# Patient Record
Sex: MALE | Race: WHITE | URBAN - METROPOLITAN AREA
[De-identification: names, ages, dates, MRNs, and addresses within clinical notes are randomized per-mention and may not be internally consistent; named-entity substitution may affect disease eponyms.]

---

## 2021-12-01 ENCOUNTER — OFFICE VISIT (OUTPATIENT)
Dept: ENT CLINIC | Age: 36
End: 2021-12-01
Payer: COMMERCIAL

## 2021-12-01 ENCOUNTER — TELEPHONE (OUTPATIENT)
Dept: ENT CLINIC | Age: 36
End: 2021-12-01

## 2021-12-01 VITALS
HEART RATE: 67 BPM | HEIGHT: 73 IN | DIASTOLIC BLOOD PRESSURE: 77 MMHG | BODY MASS INDEX: 28.65 KG/M2 | WEIGHT: 216.2 LBS | TEMPERATURE: 97.3 F | SYSTOLIC BLOOD PRESSURE: 125 MMHG

## 2021-12-01 DIAGNOSIS — H73.20 MYRINGITIS: Primary | ICD-10-CM

## 2021-12-01 DIAGNOSIS — H61.22 IMPACTED CERUMEN OF LEFT EAR: ICD-10-CM

## 2021-12-01 PROCEDURE — 1036F TOBACCO NON-USER: CPT | Performed by: OTOLARYNGOLOGY

## 2021-12-01 PROCEDURE — G8484 FLU IMMUNIZE NO ADMIN: HCPCS | Performed by: OTOLARYNGOLOGY

## 2021-12-01 PROCEDURE — G8419 CALC BMI OUT NRM PARAM NOF/U: HCPCS | Performed by: OTOLARYNGOLOGY

## 2021-12-01 PROCEDURE — 99204 OFFICE O/P NEW MOD 45 MIN: CPT | Performed by: OTOLARYNGOLOGY

## 2021-12-01 PROCEDURE — G8427 DOCREV CUR MEDS BY ELIG CLIN: HCPCS | Performed by: OTOLARYNGOLOGY

## 2021-12-01 RX ORDER — CITALOPRAM 20 MG/1
20 TABLET ORAL DAILY
COMMUNITY

## 2021-12-01 RX ORDER — CIPROFLOXACIN HYDROCHLORIDE 3.5 MG/ML
4 SOLUTION/ DROPS TOPICAL 2 TIMES DAILY
Qty: 10 ML | Refills: 0 | Status: SHIPPED | OUTPATIENT
Start: 2021-12-01 | End: 2021-12-08

## 2021-12-01 NOTE — PROGRESS NOTES
[address] [facility]  NEW PATIENT HISTORY AND PHYSICAL NOTE      Patient Name: Edgardo Persaud  Medical Record Number:  [id number]  Primary Care Physician:  Jeimy Bro MD    ChiefComplaint     Chief Complaint   Patient presents with    Ear Problem     States Lt ear drains, used to be worse. If lays on LT ear states he can feel his ear close up. States the drainage is really dark, and thick like tar. Drainage smells funny. History of Present Illness     Edgardo Persaud is an 39 y.o. male with concern for left ear drainage and pruritus intermittently for the past year, with intermittent sharp otalgia-denies alix hearing loss, vertigo; has attempted to remove wax from the left ear without relief. History of tympanic membrane rupture in the setting of acute otitis media 5-6 years ago (left ear). History of tympanostomy tubes as a teenager. No history of tympanoplasty or other middle ear surgery. Former smoker, quit . Past Medical History     No past medical history on file. Past Surgical History     No past surgical history on file. Family History     No family history on file.     Social History     Social History     Tobacco Use    Smoking status: Former Smoker     Quit date:      Years since quittin.9    Smokeless tobacco: Never Used   Vaping Use    Vaping Use: Never used   Substance Use Topics    Alcohol use: Yes     Comment: occ    Drug use: No        Allergies     No Known Allergies    Medications     Current Outpatient Medications   Medication Sig Dispense Refill    citalopram (CELEXA) 20 MG tablet Take 20 mg by mouth daily      Cyclobenzaprine HCl (FLEXERIL PO) Take by mouth      ciprofloxacin (CILOXAN) 0.3 % ophthalmic solution Place 4 drops in ear(s) 2 times daily for 7 days - to left ear 10 mL 0    dexamethasone (DECADRON) 0.1 % ophthalmic suspension Apply 4 drops to the left ear twice daily for 7 days 5 mL 1     No current equal bilaterally  ORAL CAVITY: No masses or lesions palpated, uvula is midline, moist mucous membranes, 0+ tonsils, no dentition  NECK: Normal range of motion, no thyromegaly, trachea is midline, no lymphadenopathy, no neck masses, no crepitus  CHEST: Normal respiratory effort, no retractions, breathing comfortably  SKIN: No rashes, normal appearing skin, no evidence of skin lesions/tumors  NEURO: CN 2, 3, 4, 5, 6, 7, 11, 12 intact bilaterally     Data/Imaging Review       Procedure     Binocular Otomicroscopy     Pre op: Left ear impacted cerumen   Post op: Myringitis of the left ear, ganesh-superior quadrant    Procedure : Binocular otomicroscopy  Surgeon: ALDAIR Zurita  Estimated Blood Loss: None    After obtaining consent, the patient was placed in the examination chair in the reclined position.      -Right ear: External auditory canal with slight stenosis, tympanic membrane showing myringosclerosis, middle ear appears clear  -Left ear: External auditory canal with impacted cerumen - removed, tympanic membrane showing myringitis over the left superior-anterior quadrant, middle ear appears clear - could not evaluate pars flaccida due to cerumen (patient with pain on      * Patient tolerated the procedure well with no complications        Assessment and Plan     1. Myringitis  Anterosuperior quadrant - uncertain if this is primary or related to underlying cholesteatoma given repeated drainage. Will start her on ciprofloxacin/dexamethasone drops, return to clinic in 1 week. Dry ear precautions outlined. - ciprofloxacin (CILOXAN) 0.3 % ophthalmic solution; Place 4 drops in ear(s) 2 times daily for 7 days - to left ear  Dispense: 10 mL; Refill: 0  - dexamethasone (DECADRON) 0.1 % ophthalmic suspension; Apply 4 drops to the left ear twice daily for 7 days  Dispense: 5 mL; Refill: 1    2. Impacted cerumen of left ear  Removed at this visit    Return in about 1 week (around 12/8/2021).     MD Wesley Locke

## 2021-12-01 NOTE — TELEPHONE ENCOUNTER
We received a prior authorization request for the Maxidex that was prescribed for the patient. The following medications do not require a PA. Would you like for me to proceed with the PA?  Please advise  Dexamethasone Sodium Phosphate Tier   PrednisoLONE Acetate Tier 1  Neomycin-Polymyxin-Dexameth Tier 1  Fluorometholone Tier 1    Tobramycin-Dexamethasone Tier 1

## 2021-12-01 NOTE — TELEPHONE ENCOUNTER
Per Dr. Rene Hawthorne, he would to switch the patient to the Dexamethasone Sodium Phosphate 0.1%. The prescription was given verbally to [facility] pharmacy [phone].

## 2021-12-03 NOTE — PROGRESS NOTES
Ykaov Rachel   1985, 39 y.o. male   [id number]       Referring Provider: Lucita Olivier MD  Referral Type: In an order in [address] [place] [address]    Reason for Visit: Evaluation of the cause of disorders of hearing    ADULT AUDIOLOGIC EVALUATION      Yakov Rachel is a 39 y.o. male seen today, 12/8/2021 , for an initial audiologic evaluation. Patient was seen by Lucita Olivier MD following today's evaluation. AUDIOLOGIC AND OTHER PERTINENT MEDICAL HISTORY:      Yakov Rachel noted history of ear pressure/fullness of the left ear. Patient reports history of ear infections and pressure equalizing (PE) tubes as a child. He also notes history of occupational noise exposure for 5+ years. No additional significant otologic or medical history was reported. Yakov Rachel denied tinnitus, dizziness, imbalance, history of falls, history of head trauma and family history of hearing loss. Date: 12/8/2021     IMPRESSIONS:      Today's results revealed hearing within normal limits. with excellent word recognition, bilaterally. Follow medical recommendations of Lucita Olivier MD.     ASSESSMENT AND FINDINGS:     Otoscopy revealed: Clear ear canals bilaterally    RIGHT EAR:  Hearing Sensitivity:Hearing sensitivity within normal limits from 250-8000 Hz  Speech Recognition Threshold: 5 dB HL  Word Recognition: Excellent (96%), based on NU-6 25-word list at 50 dBHL using recorded speech stimuli. Tympanometry: Normal peak pressure and compliance, Type A tympanogram, consistent with normal middle ear function. LEFT EAR:  Hearing Sensitivity:Hearing sensitivity within normal limits from 250-8000 Hz  Speech Recognition Threshold: 10 dB HL  Word Recognition: Excellent (100%), based on NU-6 25-word list at 50 dBHL using recorded speech stimuli. Tympanometry: Normal peak pressure and compliance, Type A tympanogram, consistent with normal middle ear function. Reliability: Good   Transducer:  Inserts    See scanned audiogram dated 12/8/2021  for results. PATIENT EDUCATION:       The following items were discussed with the patient:   - Good Communication Strategies  - Noise-Induced Hearing Loss and use of Hearing Protection Devices (HPDs)     Educational information was shared in the After Visit Summary. RECOMMENDATIONS:                                                                                                                                                                                                                                                            The following items are recommended based on patient report and results from today's appointment:   - Continue medical follow-up with Mio Goldberg MD.   - Retest hearing as medically indicated and/or sooner if a change in hearing is noted. - Utilize \"Good Communication Strategies\" as discussed to assist in speech understanding with communication partners. - Use hearing protection devices (HPDs), such as protective ear muffs and ear plugs, when exposed to dangerous sound levels.        Shante Rolle [facility]  Audiologist    Chart CC'd to: Mio Goldberg MD      Degree of   Hearing Sensitivity dB Range   Within Normal Limits (WNL) 0 - 20   Mild 20 - 40   Moderate 40 - 55   Moderately-Severe 55 - 70   Severe 70 - 90   Profound 90 +

## 2021-12-08 ENCOUNTER — OFFICE VISIT (OUTPATIENT)
Dept: ENT CLINIC | Age: 36
End: 2021-12-08
Payer: COMMERCIAL

## 2021-12-08 ENCOUNTER — PROCEDURE VISIT (OUTPATIENT)
Dept: AUDIOLOGY | Age: 36
End: 2021-12-08
Payer: COMMERCIAL

## 2021-12-08 VITALS — BODY MASS INDEX: 28.63 KG/M2 | WEIGHT: 216 LBS | HEIGHT: 73 IN | TEMPERATURE: 98.6 F

## 2021-12-08 DIAGNOSIS — H93.8X2 EAR PRESSURE, LEFT: Primary | ICD-10-CM

## 2021-12-08 DIAGNOSIS — H90.12 CONDUCTIVE HEARING LOSS OF LEFT EAR WITH UNRESTRICTED HEARING OF RIGHT EAR: Primary | ICD-10-CM

## 2021-12-08 DIAGNOSIS — H73.20 MYRINGITIS: ICD-10-CM

## 2021-12-08 PROCEDURE — G8419 CALC BMI OUT NRM PARAM NOF/U: HCPCS | Performed by: OTOLARYNGOLOGY

## 2021-12-08 PROCEDURE — 1036F TOBACCO NON-USER: CPT | Performed by: OTOLARYNGOLOGY

## 2021-12-08 PROCEDURE — 92504 EAR MICROSCOPY EXAMINATION: CPT | Performed by: OTOLARYNGOLOGY

## 2021-12-08 PROCEDURE — G8484 FLU IMMUNIZE NO ADMIN: HCPCS | Performed by: OTOLARYNGOLOGY

## 2021-12-08 PROCEDURE — 99213 OFFICE O/P EST LOW 20 MIN: CPT | Performed by: OTOLARYNGOLOGY

## 2021-12-08 PROCEDURE — 92567 TYMPANOMETRY: CPT | Performed by: AUDIOLOGIST

## 2021-12-08 PROCEDURE — 92557 COMPREHENSIVE HEARING TEST: CPT | Performed by: AUDIOLOGIST

## 2021-12-08 PROCEDURE — G8427 DOCREV CUR MEDS BY ELIG CLIN: HCPCS | Performed by: OTOLARYNGOLOGY

## 2021-12-08 NOTE — PATIENT INSTRUCTIONS
Good Communication Strategies    Communication can be challenging for anyone, but can be especially difficult for those with some degree of hearing loss. While we may not be able to control every factor that may lead to difficulty with communication, there are Good Communication Strategies that we can all use in our day-to-day lives. Communication takes both parties working together for it to be successful. Tips as a Listener:   1. Control your environment. It is important to limit the amount of background noise in the room when possible. You should also consider having a good light source in the room to best see the other person. 2. Ask for clarification. Instead of saying \"What?\", you can use parts of what you heard to make a new question. For example, if you heard the word \"Thursday\" but not the rest of the week, you may ask \"What was that about Thursday? \" or \"What did you want to do Thursday? \". This shows the person talking that you are listening and will help them better explain what they are saying. 3. Be an advocate for yourself. If you are hearing but not understanding, tell the other person \"I can hear you, but I need you to slow down when you speak. \"  Or if someone is facing the other direction, say \"I cannot hear you when you are not looking at me when we talk. \"       Tips as a Talker:   - Sit or stand 3 to 6 feet away to maximize audibility         -- It is unrealistic to believe someone else will fully hear your message if you are speaking from across the room or in a different room in the house   - Stay at eye level to help with visual cues   - Make sure you have the persons attention before speaking   - Use facial expressions and gestures to accentuate your message   - Raise your voice slightly (do not scream)   - Speak slowly and distinctly   - Use short, simple sentences   - Rephrase your words if the person is having a hard time understanding you    - To avoid distortion, dont speak directly into a persons ear      Some additional items that may be helpful:   - Remain patient - this is important for both parties   - Write down items that still cannot be heard/understood. You may write with pen/paper or consider typing/texting on a cell phone or smart device. - If background noise is unavoidable, try to keep yourself in a good position in the room. By sitting at a samuels on the side of the restaurant (preferably a corner), it will be easier to communicate than if you were sitting at a table in the middle with background noise surrounding you. Keep yourself positioned away from music speakers or heavy foot traffic. Noise-Induced Hearing Loss  What it is, and what you can do to prevent it      Exposure to loud sounds, in an occupational setting or recreational, can cause permanent hearing loss. Sound is measured in decibels (dB). Noise-induced hearing loss is the ONLY type of preventable hearing loss. Hearing loss related to noise exposure can occur at any age. There are small sensory cells, called inner and outer hair cells, within the inner ear (cochlea). These cells process the loudness (intensity) and pitch (frequency) of sound and send the signal to the brain via our auditory nerve (vestibulocochlear nerve, cranial nerve VIII). When these cells are damaged, they can result in permanent hearing loss and/or tinnitus. The hair cells responsible for high frequency sounds, like birds chirping, are most likely to be damaged due to loud sounds. The high frequency sounds are also very important for our clarity and understanding of speech. OCCUPATIONAL NOISE EXPOSURE RECREATIONAL NOISE EXPOSURE   Some jobs may have exposure to loud sounds in the workplace. These jobs may include but are not limited to:  [employer] [facility]   Construction   Welding   Landscaping   Hairdressing/hairstyling   Musicians  [employer]    ...  And more! Many activities outside of work may cause permanent hearing loss. These activities may include but are not limited to:  Lawnmowers, leaf blowers  [employer] (such as pigs squealing)   Chainsaws and other power tools  [employer] musical instruments and/or singing   Listening to music too loudly - at concerts, through stereo, through ear buds or headphones   Attending sporting events   Attending fireworks shows or using fireworks at home  [employer] of firearms   . .. And more! REDUCE OR PROTECT YOUR EARS FROM NOISE EXPOSURE    To do your best to avoid noise-induced hearing loss, here are some tips:   Limit exposure to loud sounds. 85 dB (decibels) is safe for 8 hours. As sounds are louder, the length of time the sound is safe lessens. These numbers are cumulative across a 24-hour period. (NIOSH and CDC, 2002)  o 85 dB is safe for 8 hours  o 88 dB is safe for 4 hours  o 91 dB is safe for 2 hours  o 94 dB is safe for 1 hour  o 97 dB is safe for 30 minutes  o 100 dB is safe for 15 minutes  o 103 dB is safe for 7.5 minutes  o 106 dB is safe for 3.75 minutes  o 109 dB is safe for LESS THAN 2 minutes  o 112 dB is safe for LESS THAN 1 minute  o 115 dB is safe for ~ 30 seconds  o 130 dB can cause IMMEDIATE hearing loss   If you are unsure if a sound is too loud, consider checking the sound level with a \"sound level meter\". There are apps on smart devices that can measure the loudness of the sound. They are not as accurate as expensive equipment used by scientists, but it will give you a guesstimate of how loud the sound is, and if it may be damaging to your hearing.  If you cannot avoid loud sounds, here are ways to reduce your exposure:  o 1. Wear hearing protection  - Ear plugs and protective ear muffs can be used to reduce the intensity of the sound. The higher the NRR (noise reduction rating), the better reduction of the intensity of the sound   o 2.  Turn the volume down  - When listening to music, turn the volume down, especially when wearing ear buds or headphones. A good rule of thumb is to not go beyond the middle setting on your device. If you can't hear someone talking to you from arm's length away, your music may be at a level that it can cause damage. If someone else can hear your music from 3 feet away, it may also be at a level that it can cause damage. o 3. Walk away from the sound  - If you do not have the ability to wear hearing protection or turn down the volume of the sound, you should do your best to move away from the source of the sound. - Sound decreases in intensity as we move further from the source. The sound will decrease by 6 dB for every doubling of distance from the sound source. Exposure to these sounds may cause permanent damage to your hearing.   If you suspect your hearing has changed, it is recommended that you have your hearing tested by your audiologist.

## 2021-12-08 NOTE — PATIENT INSTRUCTIONS
Ear hygiene instructions:  -Do not use q-tips or magdiel pins to clean out ears  -Do not place fingers in ear canals  -If ears feel occluded by wax, may use hydrogen peroxide (10 drops in each ear every 2 weeks, lie with ear upright for 10-15 minutes after placing hydrogen peroxide drops)    -Dry ear precautions recommended as follows:   Patient to place either:  1. A silicone ear plug  2.  A cotton ball coated in Vaseline   into both ears during showering, instructed to remove afterwards to aerate ears     Patient instructed to avoid digital trauma to the ears   Instructed to notify the clinic immediately with any acute otalgia, hearing loss, purulent otorrhea

## 2021-12-08 NOTE — Clinical Note
Dr. Quiles Said,    Thank you for your referral for audiometric testing on this patient. Please see the scanned audiogram (under \"Media\" tab) and encounter note for details. If you have any questions, or if there is anything else you need, please let me know.       Wing Julien  Audiologist  ---  [facility] - Audiology

## 2021-12-08 NOTE — PROGRESS NOTES
[facility] FOLLOW UP NOTE    Patient Name: Iraj Huizar  Medical Record Number:  [id number]  Primary Care Physician:  Carmina Contreras MD    ChiefComplaint     Chief Complaint   Patient presents with    Follow-up     States his ear has improved     History of Present Illness     Iraj Huizar is an 39 y.o. male with concern for left ear drainage and pruritus intermittently for the past year, with intermittent sharp otalgia-denies alix hearing loss, vertigo; has attempted to remove wax from the left ear without relief. History of tympanic membrane rupture in the setting of acute otitis media 5-6 years ago (left ear). History of tympanostomy tubes as a teenager. No history of tympanoplasty or other middle ear surgery. Former smoker, quit     Interval History 2021: Significant improvement in left ear pain, no drainage/pruritus. Improved hearing in the left ear. Past Medical History     No past medical history on file. Past Surgical History     No past surgical history on file. Family History     No family history on file.     Social History     Social History     Tobacco Use    Smoking status: Former Smoker     Quit date:      Years since quittin.9    Smokeless tobacco: Never Used   Vaping Use    Vaping Use: Never used   Substance Use Topics    Alcohol use: Yes     Comment: occ    Drug use: No        Allergies     No Known Allergies    Medications     Current Outpatient Medications   Medication Sig Dispense Refill    citalopram (CELEXA) 20 MG tablet Take 20 mg by mouth daily      Cyclobenzaprine HCl (FLEXERIL PO) Take by mouth      ciprofloxacin (CILOXAN) 0.3 % ophthalmic solution Place 4 drops in ear(s) 2 times daily for 7 days - to left ear 10 mL 0    dexamethasone (DECADRON) 0.1 % ophthalmic suspension Apply 4 drops to the left ear twice daily for 7 days 5 mL 1     No current facility-administered medications for this visit.        Review of Systems     REVIEW OF SYSTEMS  The following systems were reviewed and revealed the following in addition to any already discussed in the HPI:    CONSTITUTIONAL: no weight loss, no fever, no night sweats, no chills  EYES: no vision changes, no blurry vision  EARS: no changes in hearing, + intermittent left ear otalgia  NOSE: no epistaxis, no rhinorrhea  RESPIRATORY: no difficulty breathing, no shortness of breath  CV: no chest pain, no peripheral vascular disease  HEME: No coagulation disorder, no bleeding disorder  NEURO: No TIA or stroke-like symptoms  SKIN: No new rashes in the head and neck, no recent skin cancers  MOUTH: No new ulcers, no recent teeth infections  GASTROINTESTINAL: No diarrhea, stomach pain  PSYCH: No anxiety, no depression    PhysicalExam     Vitals:    12/08/21 0925   Temp: 98.6 °F (37 °C)   Weight: 216 lb (98 kg)   Height: 6' 1\" (1.854 m)       PHYSICAL EXAM  Temp 98.6 °F (37 °C)   Ht 6' 1\" (1.854 m)   Wt 216 lb (98 kg)   BMI 28.50 kg/m²     GENERAL: No Acute Distress, Alert and Oriented, noHoarseness  EYES: EOMI, Anti-icteric  NOSE: On anterior rhinoscopy there is no epistaxis, nasal mucosa within normal limits, no purulent drainage  EARS: Normal external appearance; on portable otomicroscopy:  -Right ear: External auditory canal without stenosis, tympanic membrane clear, no middle ear effusions or retractions  -Left ear: External auditory canal clear  -Tuning fork testing: With a 512-Hz tuning fork the Martin is left lateralizing, The Rinne on the right ear the is air>bone conduction, on the left ear air>bone conduction  FACE: 1/6 House-Brackmann Scale, symmetric, sensation equal bilaterally  ORAL CAVITY: No masses or lesions palpated, uvula is midline, moist mucous membranes, 0+ tonsils, no dentition  NECK: Normal range of motion, no thyromegaly, trachea is midline, no lymphadenopathy, no neck masses, no crepitus  CHEST: Normal respiratory effort, no retractions, breathing comfortably  SKIN: No rashes, normal appearing skin, no evidence of skin lesions/tumors  NEURO: CN 2, 3, 4, 5, 6, 7, 11, 12 intact bilaterally     Data/Imaging Review          Procedure     Binocular Otomicroscopy     Pre op: Left ear myringitis   Post op: Myringitis of the left ear, ganesh-superior quadrant    Procedure : Binocular otomicroscopy  Surgeon: ALDAIR Zurita  Estimated Blood Loss: None    After obtaining consent, the patient was placed in the examination chair in the reclined position.      -Right ear: External auditory canal with slight stenosis, tympanic membrane showing myringosclerosis, middle ear appears clear  -Left ear: External auditory canal with wet squamous debris/cerumen - removed from the squamous tympanic membrane with resolution of myringitis - uncertain if pars flaccida retraction as patient stated significant pain with suctioning over the lateral process of the malleus. No tympanic membrane perforation noted, middle ear appears clear     * Patient tolerated the procedure well with no complications      Assessment and Plan      Diagnosis Orders   1. Conductive hearing loss of left ear with unrestricted hearing of right ear  [facility], Audiology, [facility]   2. Myringitis       79-year-old male with history of left ear otalgia, pruritus and intermittent drainageon examination we appreciated myringitis of the left anterior superior tympanic membrane, which appears to be resolved after a course of ciprofloxacin/dexamethasone drops. His Martin, however lateralizes to the left ear and he does not appear to have middle ear effusion nor tympanic membrane perforation. Audiometric testing shows no asymmetries, normal pure tone thresholds. He is to call us with any worsening of symptoms. Return if symptoms worsen or fail to improve.     Gali Verduzco MD  [address] [place] [address],[address]  Department of Otolaryngology/Head and Neck Surgery  12/8/21    I have performed a head and neck physical exam personally or was physically present during the key or critical portions of the service. Medical Decision Making: The following items were considered in medical decision making:  Independent review of images  Review / order clinical lab tests  Review / order radiology tests  Decision to obtain old records  Review and summation of old records as accessed through [facility] (a summary of my findings in these old records: none)     Portions of this note were dictated using Dragon.  There may be linguistic errors secondary to the use of this program.

## 2024-09-25 ENCOUNTER — HOSPITAL ENCOUNTER (OUTPATIENT)
Dept: ULTRASOUND IMAGING | Age: 39
Discharge: HOME | End: 2024-09-25
Attending: UROLOGY
Payer: COMMERCIAL

## 2024-09-25 DIAGNOSIS — N50.89 OTHER SPECIFIED DISORDERS OF THE MALE GENITAL ORGANS: ICD-10-CM

## 2024-09-25 PROCEDURE — 76870 US EXAM SCROTUM: CPT
